# Patient Record
Sex: FEMALE | Race: WHITE | URBAN - METROPOLITAN AREA
[De-identification: names, ages, dates, MRNs, and addresses within clinical notes are randomized per-mention and may not be internally consistent; named-entity substitution may affect disease eponyms.]

---

## 2017-09-22 ENCOUNTER — OFFICE VISIT (OUTPATIENT)
Dept: URGENT CARE | Facility: RETAIL CLINIC | Age: 32
End: 2017-09-22
Payer: COMMERCIAL

## 2017-09-22 VITALS — DIASTOLIC BLOOD PRESSURE: 86 MMHG | SYSTOLIC BLOOD PRESSURE: 123 MMHG | HEART RATE: 81 BPM | TEMPERATURE: 99 F

## 2017-09-22 DIAGNOSIS — J02.9 ACUTE PHARYNGITIS, UNSPECIFIED ETIOLOGY: Primary | ICD-10-CM

## 2017-09-22 LAB — S PYO AG THROAT QL IA.RAPID: NORMAL

## 2017-09-22 PROCEDURE — 87880 STREP A ASSAY W/OPTIC: CPT | Mod: QW | Performed by: PHYSICIAN ASSISTANT

## 2017-09-22 PROCEDURE — 87081 CULTURE SCREEN ONLY: CPT | Performed by: PHYSICIAN ASSISTANT

## 2017-09-22 PROCEDURE — 99213 OFFICE O/P EST LOW 20 MIN: CPT | Performed by: PHYSICIAN ASSISTANT

## 2017-09-22 NOTE — MR AVS SNAPSHOT
"              After Visit Summary   9/22/2017    Sandy Starr    MRN: [id number]           Patient Information     Date Of Birth          1985        Visit Information        Provider Department      9/22/2017 6:10 PM Giovana Brewster PA-C [facility] [place]        Today's Diagnoses     Acute pharyngitis, unspecified etiology    -  1      Care Instructions    Rapid strep test today is negative.   Your throat culture is pending. [facility] will call if positive results to start antibiotics at that time; No call if the culture is negative.  Drink plenty of fluids and rest.  May use salt water gargles- about 8 oz warm water with about 1 teaspoon salt  Sucrets and Cepacol spray are over the counter medications that numb the throat.  Over the counter pain relievers such as tylenol or ibuprofen may be used as needed.   Honey lemon tea helps to soothe the throat. \"Throat Coat\" tea is soothing as well.  Please follow up with primary care provider if not improving, worsening or new symptoms.          Follow-ups after your visit        Who to contact     You can reach your care team any time of the day by calling [phone].  Notification of test results:  If you have an abnormal lab result, we will notify you by phone as soon as possible.         Additional Information About Your Visit        MyChart Information     [facility] gives you secure access to your electronic health record. If you see a primary care provider, you can also send messages to your care team and make appointments. If you have questions, please call your primary care clinic.  If you do not have a primary care provider, please call [phone] and they will assist you.        Care EveryWhere ID     This is your Care EveryWhere ID. This could be used by other organizations to access your [place] medical records  [id number]        Your Vitals Were     Pulse Temperature                81 99  F (37.2  C) (Oral)           Blood " Pressure from Last 3 Encounters:   09/22/17 123/86   09/27/14 106/73   07/29/14 127/86    Weight from Last 3 Encounters:   05/03/13 120 lb 9.6 oz (54.7 kg)   03/22/13 120 lb 9.6 oz (54.7 kg)   04/04/11 112 lb 6.4 oz (51 kg)              We Performed the Following     BETA STREP GROUP A R/O CULTURE     RAPID STREP SCREEN        Primary Care Provider Office Phone # Fax #    Ashley Chilel DO Kp [phone] [address] [place] [address]        Equal Access to Services     [facility]: Hadii aad pierre hadashbrigida Soaddie, waaxda lulayadaha, qaybta kaalmada adefernando, jluis chiu . So [facility] [phone].    ATENCIÓN: Si habla español, tiene a lux disposición servicios gratuitos de asistencia lingüística. Llame al [phone].    We comply with applicable federal civil rights laws and [place] laws. We do not discriminate on the basis of race, color, national origin, age, disability sex, sexual orientation or gender identity.            Thank you!     Thank you for choosing [facility]  for your care. Our goal is always to provide you with excellent care. Hearing back from our patients is one way we can continue to improve our services. Please take a few minutes to complete the written survey that you may receive in the mail after your visit with us. Thank you!             Your Updated Medication List - Protect others around you: Learn how to safely use, store and throw away your medicines at www.disposemymeds.org.          This list is accurate as of: 9/22/17  6:32 PM.  Always use your most recent med list.                   Brand Name Dispense Instructions for use Diagnosis    dextromethorphan 15 MG/5ML syrup      Take 10 mLs by mouth 4 times daily as needed for cough        IBUPROFEN PO           PRENATAL VITAMINS PO      Take  by mouth.        sertraline 50 MG tablet    ZOLOFT

## 2017-09-22 NOTE — NURSING NOTE
"Chief Complaint   Patient presents with     Throat Pain     2 days, no appetite     Generalized Body Aches       Initial /86 (BP Location: Left arm)  Pulse 81  Temp 99  F (37.2  C) (Oral) Estimated body mass index is 21.03 kg/(m^2) as calculated from the following:    Height as of 5/3/13: 5' 3.5\" (1.613 m).    Weight as of 5/3/13: 120 lb 9.6 oz (54.7 kg).  Medication Reconciliation: complete  "

## 2017-09-22 NOTE — PROGRESS NOTES
Chief Complaint   Patient presents with     Throat Pain     2 days, no appetite     Generalized Body Aches     SUBJECTIVE:  Sandy Starr is a 32 year old female presenting with a chief complaint of a sore throat.  Onset of symptoms was 2 days ago.  Course of illness: gradual onset.  Severity: moderate  Current and Associated symptoms: body aches, reduced appetite  Treatment measures tried include: OTC meds- none today.  Predisposing factors include: None.    Past Medical History:   Diagnosis Date     Abnormal glandular Papanicolaou smear of cervix     Abn. Pap smear (cervix), S/P LEEP in 05/2010     Current Outpatient Prescriptions   Medication Sig Dispense Refill     sertraline (ZOLOFT) 50 MG tablet        IBUPROFEN PO        dextromethorphan 15 MG/5ML syrup Take 10 mLs by mouth 4 times daily as needed for cough       PRENATAL VITAMINS PO Take  by mouth.       Social History   Substance Use Topics     Smoking status: Never Smoker     Smokeless tobacco: Never Used     Alcohol use No     Allergies   Allergen Reactions     Zithromax [Azithromycin Dihydrate] Hives     ROS:  Review of systems negative except as stated above.    OBJECTIVE:   /86 (BP Location: Left arm)  Pulse 81  Temp 99  F (37.2  C) (Oral)  GENERAL APPEARANCE: healthy, alert and in no distress  HEENT: Eyes PEERL, conjunctiva clear. Bilateral ear canals and TMs normal. Nose normal. Pharynx erythematous without tonsillar hypertrophy or exudate noted.  NECK: supple, non-tender to palpation, no adenopathy noted  RESP: lungs clear to auscultation - no rales, rhonchi or wheezes  CV: regular rates and rhythm, normal S1 S2, no murmur noted  SKIN: no suspicious lesions or rashes    Rapid Strep test is negative; await throat culture results.    ASSESSMENT:    ICD-10-CM    1. Acute pharyngitis, unspecified etiology J02.9 RAPID STREP SCREEN     BETA STREP GROUP A R/O CULTURE     PLAN:   Patient Instructions   Rapid strep test today is negative.   Your  "throat culture is pending. [facility] will call if positive results to start antibiotics at that time; No call if the culture is negative.  Drink plenty of fluids and rest.  May use salt water gargles- about 8 oz warm water with about 1 teaspoon salt  Sucrets and Cepacol spray are over the counter medications that numb the throat.  Over the counter pain relievers such as tylenol or ibuprofen may be used as needed.   Honey lemon tea helps to soothe the throat. \"Throat Coat\" tea is soothing as well.  Please follow up with primary care provider if not improving, worsening or new symptoms.    Follow up with primary care provider with any problems, questions or concerns or if symptoms worsen or fail to improve. Patient agreed to plan and verbalized understanding.    KWADWO [place] - [place]  "

## 2017-09-25 LAB — BETA STREP CONFIRM: NORMAL

## 2017-10-08 ENCOUNTER — HEALTH MAINTENANCE LETTER (OUTPATIENT)
Age: 32
End: 2017-10-08

## 2017-10-22 ENCOUNTER — HEALTH MAINTENANCE LETTER (OUTPATIENT)
Age: 32
End: 2017-10-22

## 2018-10-15 ENCOUNTER — HEALTH MAINTENANCE LETTER (OUTPATIENT)
Age: 33
End: 2018-10-15

## 2020-02-24 ENCOUNTER — HEALTH MAINTENANCE LETTER (OUTPATIENT)
Age: 35
End: 2020-02-24

## 2020-12-13 ENCOUNTER — HEALTH MAINTENANCE LETTER (OUTPATIENT)
Age: 35
End: 2020-12-13

## 2021-04-17 ENCOUNTER — HEALTH MAINTENANCE LETTER (OUTPATIENT)
Age: 36
End: 2021-04-17

## 2021-09-26 ENCOUNTER — HEALTH MAINTENANCE LETTER (OUTPATIENT)
Age: 36
End: 2021-09-26

## 2022-05-08 ENCOUNTER — HEALTH MAINTENANCE LETTER (OUTPATIENT)
Age: 37
End: 2022-05-08

## 2023-04-23 ENCOUNTER — HEALTH MAINTENANCE LETTER (OUTPATIENT)
Age: 38
End: 2023-04-23

## 2023-06-02 ENCOUNTER — HEALTH MAINTENANCE LETTER (OUTPATIENT)
Age: 38
End: 2023-06-02